# Patient Record
Sex: FEMALE | Race: WHITE | NOT HISPANIC OR LATINO | URBAN - METROPOLITAN AREA
[De-identification: names, ages, dates, MRNs, and addresses within clinical notes are randomized per-mention and may not be internally consistent; named-entity substitution may affect disease eponyms.]

---

## 2021-08-18 ENCOUNTER — INPATIENT (INPATIENT)
Facility: HOSPITAL | Age: 76
LOS: 1 days | Discharge: NURSING, REHAB OR LONG-TERM CARE | DRG: 299 | End: 2021-08-20
Attending: THORACIC SURGERY (CARDIOTHORACIC VASCULAR SURGERY) | Admitting: THORACIC SURGERY (CARDIOTHORACIC VASCULAR SURGERY)
Payer: MEDICARE

## 2021-08-18 VITALS
HEART RATE: 96 BPM | SYSTOLIC BLOOD PRESSURE: 131 MMHG | RESPIRATION RATE: 33 BRPM | WEIGHT: 116.4 LBS | DIASTOLIC BLOOD PRESSURE: 88 MMHG | OXYGEN SATURATION: 91 % | HEIGHT: 65 IN

## 2021-08-18 DIAGNOSIS — I25.10 ATHEROSCLEROTIC HEART DISEASE OF NATIVE CORONARY ARTERY WITHOUT ANGINA PECTORIS: ICD-10-CM

## 2021-08-18 DIAGNOSIS — I71.4 ABDOMINAL AORTIC ANEURYSM, WITHOUT RUPTURE: ICD-10-CM

## 2021-08-18 DIAGNOSIS — F20.9 SCHIZOPHRENIA, UNSPECIFIED: ICD-10-CM

## 2021-08-18 DIAGNOSIS — Z98.890 OTHER SPECIFIED POSTPROCEDURAL STATES: Chronic | ICD-10-CM

## 2021-08-18 DIAGNOSIS — Z90.710 ACQUIRED ABSENCE OF BOTH CERVIX AND UTERUS: Chronic | ICD-10-CM

## 2021-08-18 LAB
ALBUMIN SERPL ELPH-MCNC: 3.3 G/DL — SIGNIFICANT CHANGE UP (ref 3.3–5)
ALP SERPL-CCNC: 218 U/L — HIGH (ref 40–120)
ALT FLD-CCNC: 111 U/L — HIGH (ref 10–45)
ANION GAP SERPL CALC-SCNC: 12 MMOL/L — SIGNIFICANT CHANGE UP (ref 5–17)
APTT BLD: 28 SEC — SIGNIFICANT CHANGE UP (ref 27.5–35.5)
AST SERPL-CCNC: 69 U/L — HIGH (ref 10–40)
BASOPHILS # BLD AUTO: 0.02 K/UL — SIGNIFICANT CHANGE UP (ref 0–0.2)
BASOPHILS NFR BLD AUTO: 0.2 % — SIGNIFICANT CHANGE UP (ref 0–2)
BILIRUB SERPL-MCNC: 0.5 MG/DL — SIGNIFICANT CHANGE UP (ref 0.2–1.2)
BLD GP AB SCN SERPL QL: NEGATIVE — SIGNIFICANT CHANGE UP
BUN SERPL-MCNC: 12 MG/DL — SIGNIFICANT CHANGE UP (ref 7–23)
CALCIUM SERPL-MCNC: 9.4 MG/DL — SIGNIFICANT CHANGE UP (ref 8.4–10.5)
CHLORIDE SERPL-SCNC: 90 MMOL/L — LOW (ref 96–108)
CO2 SERPL-SCNC: 21 MMOL/L — LOW (ref 22–31)
CREAT SERPL-MCNC: 0.4 MG/DL — LOW (ref 0.5–1.3)
EOSINOPHIL # BLD AUTO: 0 K/UL — SIGNIFICANT CHANGE UP (ref 0–0.5)
EOSINOPHIL NFR BLD AUTO: 0 % — SIGNIFICANT CHANGE UP (ref 0–6)
GAS PNL BLDA: SIGNIFICANT CHANGE UP
GLUCOSE SERPL-MCNC: 158 MG/DL — HIGH (ref 70–99)
HCT VFR BLD CALC: 32.2 % — LOW (ref 34.5–45)
HGB BLD-MCNC: 10.3 G/DL — LOW (ref 11.5–15.5)
IMM GRANULOCYTES NFR BLD AUTO: 0.4 % — SIGNIFICANT CHANGE UP (ref 0–1.5)
INR BLD: 1.26 RATIO — HIGH (ref 0.88–1.16)
LYMPHOCYTES # BLD AUTO: 0.54 K/UL — LOW (ref 1–3.3)
LYMPHOCYTES # BLD AUTO: 4.6 % — LOW (ref 13–44)
MCHC RBC-ENTMCNC: 25.1 PG — LOW (ref 27–34)
MCHC RBC-ENTMCNC: 32 GM/DL — SIGNIFICANT CHANGE UP (ref 32–36)
MCV RBC AUTO: 78.3 FL — LOW (ref 80–100)
MONOCYTES # BLD AUTO: 0.83 K/UL — SIGNIFICANT CHANGE UP (ref 0–0.9)
MONOCYTES NFR BLD AUTO: 7 % — SIGNIFICANT CHANGE UP (ref 2–14)
NEUTROPHILS # BLD AUTO: 10.37 K/UL — HIGH (ref 1.8–7.4)
NEUTROPHILS NFR BLD AUTO: 87.8 % — HIGH (ref 43–77)
NRBC # BLD: 0 /100 WBCS — SIGNIFICANT CHANGE UP (ref 0–0)
NT-PROBNP SERPL-SCNC: 1807 PG/ML — HIGH (ref 0–300)
PLATELET # BLD AUTO: 440 K/UL — HIGH (ref 150–400)
POTASSIUM SERPL-MCNC: 4.3 MMOL/L — SIGNIFICANT CHANGE UP (ref 3.5–5.3)
POTASSIUM SERPL-SCNC: 4.3 MMOL/L — SIGNIFICANT CHANGE UP (ref 3.5–5.3)
PROT SERPL-MCNC: 6.1 G/DL — SIGNIFICANT CHANGE UP (ref 6–8.3)
PROTHROM AB SERPL-ACNC: 15 SEC — HIGH (ref 10.6–13.6)
RBC # BLD: 4.11 M/UL — SIGNIFICANT CHANGE UP (ref 3.8–5.2)
RBC # FLD: 14.5 % — SIGNIFICANT CHANGE UP (ref 10.3–14.5)
RH IG SCN BLD-IMP: POSITIVE — SIGNIFICANT CHANGE UP
RH IG SCN BLD-IMP: POSITIVE — SIGNIFICANT CHANGE UP
SODIUM SERPL-SCNC: 123 MMOL/L — LOW (ref 135–145)
WBC # BLD: 11.81 K/UL — HIGH (ref 3.8–10.5)
WBC # FLD AUTO: 11.81 K/UL — HIGH (ref 3.8–10.5)

## 2021-08-18 PROCEDURE — 99291 CRITICAL CARE FIRST HOUR: CPT

## 2021-08-18 PROCEDURE — 99292 CRITICAL CARE ADDL 30 MIN: CPT

## 2021-08-18 PROCEDURE — 36620 INSERTION CATHETER ARTERY: CPT

## 2021-08-18 RX ORDER — CEFUROXIME AXETIL 250 MG
1500 TABLET ORAL ONCE
Refills: 0 | Status: COMPLETED | OUTPATIENT
Start: 2021-08-18 | End: 2021-08-18

## 2021-08-18 RX ORDER — LABETALOL HCL 100 MG
1 TABLET ORAL
Qty: 400 | Refills: 0 | Status: DISCONTINUED | OUTPATIENT
Start: 2021-08-18 | End: 2021-08-19

## 2021-08-18 RX ORDER — CHLORHEXIDINE GLUCONATE 213 G/1000ML
15 SOLUTION TOPICAL ONCE
Refills: 0 | Status: COMPLETED | OUTPATIENT
Start: 2021-08-18 | End: 2021-08-18

## 2021-08-18 RX ORDER — CHLORHEXIDINE GLUCONATE 213 G/1000ML
1 SOLUTION TOPICAL ONCE
Refills: 0 | Status: COMPLETED | OUTPATIENT
Start: 2021-08-18 | End: 2021-08-18

## 2021-08-18 NOTE — ED BEHAVIORAL HEALTH ASSESSMENT NOTE - NSUNABLEASSESSPROTRISKCOMMENT_PSY_ALL_CORE
RN called and spoke with patient. Pleasant lady, well informed. She wants to know the CPT code for injection and also who does the PA for injection.  RN told her patient the CPT code from looking at the order but RN doesn't quite know who does PA for injection. RN told patient to try to  Contact imaging PA person and see if she has any contact or resources for this patient. Patient agreed to this plan and will keep RN posted.    Rickey Woodard RN      [facility] Call Center    Phone Message    May a detailed message be left on voicemail: yes     Reason for Call: Other: imaging and injection CPT codes needed for insurance. Pt would like for Carlos to call [facility] prior auth at 1 [phone], and provide the codes to them. Pt would like a confirmation phone call.      CPT codes for scan, MRI, and injection    Action Taken: Message routed to:  [facility] ([facility] ortho    Travel Screening: Not Applicable                                                                       N/A
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
## 2021-08-18 NOTE — H&P ADULT - NSHPLABSRESULTS_GEN_ALL_CORE
cefuroxime  IVPB 1500 milliGRAM(s) IV Intermittent once  chlorhexidine 0.12% Liquid 15 milliLiter(s) Swish and Spit once  chlorhexidine 4% Liquid 1 Application(s) Topical once  labetalol Infusion 1 mG/Min IV Continuous <Continuous>                            10.3   11.81 )-----------( 440      ( 18 Aug 2021 22:38 )             32.2       Hemoglobin: 10.3 g/dL (08-18 @ 22:38)            Creatinine Trend:     COAGS:           T(C): --  HR: 96 (08-18-21 @ 22:10) (96 - 96)  BP: 131/88 (08-18-21 @ 22:10) (131/88 - 131/88)  RR: 33 (08-18-21 @ 22:10) (33 - 33)  SpO2: 91% (08-18-21 @ 22:10) (91% - 91%)  Wt(kg): --    I&O's Summary      CT angio : + type A dissection .

## 2021-08-18 NOTE — H&P ADULT - NSICDXPASTMEDICALHX_GEN_ALL_CORE_FT
PAST MEDICAL HISTORY:  Acute on chronic systolic congestive heart failure     CAD (coronary artery disease)     Chronic atrial fibrillation     Elevated lipids     Schizophrenia

## 2021-08-18 NOTE — H&P ADULT - NSHPPHYSICALEXAM_GEN_ALL_CORE
alert and oriented x 3  no JVD  clear viraj , no wheeze   reg s1s2  soft , + bs , non tender  - edema, ++ pulses

## 2021-08-18 NOTE — ED BEHAVIORAL HEALTH ASSESSMENT NOTE - DESCRIPTION
She came to the ED at [facility] for dyspnea and had a chest x ray and cat scan demonstrating a aortic aneurysm and pericardial effusion.   She was transferred to [facility] for emergent repair of these medical conditions. unknown abdominal aortic aneurysm, schizophrenia
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
## 2021-08-18 NOTE — ED BEHAVIORAL HEALTH ASSESSMENT NOTE - OTHER
deferred frail and disheveled deferred (virtual visit) none elicited during brief interview unable to assess N/A none elicited limited as prematurely ended interview primary team unknown
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
## 2021-08-19 DIAGNOSIS — I71.00 DISSECTION OF UNSPECIFIED SITE OF AORTA: ICD-10-CM

## 2021-08-19 LAB
A1C WITH ESTIMATED AVERAGE GLUCOSE RESULT: 6 % — HIGH (ref 4–5.6)
CHOLEST SERPL-MCNC: 149 MG/DL — SIGNIFICANT CHANGE UP
ESTIMATED AVERAGE GLUCOSE: 126 MG/DL — HIGH (ref 68–114)
GAS PNL BLDA: SIGNIFICANT CHANGE UP
HDLC SERPL-MCNC: 45 MG/DL — LOW
LIPID PNL WITH DIRECT LDL SERPL: 95 MG/DL — SIGNIFICANT CHANGE UP
NON HDL CHOLESTEROL: 104 MG/DL — SIGNIFICANT CHANGE UP
OSMOLALITY SERPL: 263 MOSMOL/KG — LOW (ref 280–301)
OSMOLALITY UR: 158 MOS/KG — LOW (ref 300–900)
PREALB SERPL-MCNC: 9 MG/DL — LOW (ref 20–40)
SARS-COV-2 RNA SPEC QL NAA+PROBE: SIGNIFICANT CHANGE UP
T3 SERPL-MCNC: 61 NG/DL — LOW (ref 80–200)
T4 AB SER-ACNC: 7.8 UG/DL — SIGNIFICANT CHANGE UP (ref 4.6–12)
TRIGL SERPL-MCNC: 48 MG/DL — SIGNIFICANT CHANGE UP
TSH SERPL-MCNC: 1.36 UIU/ML — SIGNIFICANT CHANGE UP (ref 0.27–4.2)

## 2021-08-19 PROCEDURE — 99232 SBSQ HOSP IP/OBS MODERATE 35: CPT

## 2021-08-19 PROCEDURE — ZZZZZ: CPT

## 2021-08-19 PROCEDURE — 99291 CRITICAL CARE FIRST HOUR: CPT

## 2021-08-19 PROCEDURE — 94010 BREATHING CAPACITY TEST: CPT | Mod: 26

## 2021-08-19 RX ORDER — LABETALOL HCL 100 MG
1 TABLET ORAL
Qty: 400 | Refills: 0 | Status: DISCONTINUED | OUTPATIENT
Start: 2021-08-19 | End: 2021-08-19

## 2021-08-19 RX ORDER — SODIUM CHLORIDE 9 MG/ML
3 INJECTION INTRAMUSCULAR; INTRAVENOUS; SUBCUTANEOUS EVERY 8 HOURS
Refills: 0 | Status: DISCONTINUED | OUTPATIENT
Start: 2021-08-19 | End: 2021-08-20

## 2021-08-19 RX ORDER — ALBUTEROL 90 UG/1
2.5 AEROSOL, METERED ORAL THREE TIMES A DAY
Refills: 0 | Status: DISCONTINUED | OUTPATIENT
Start: 2021-08-19 | End: 2021-08-19

## 2021-08-19 RX ORDER — HALOPERIDOL DECANOATE 100 MG/ML
2 INJECTION INTRAMUSCULAR EVERY 12 HOURS
Refills: 0 | Status: DISCONTINUED | OUTPATIENT
Start: 2021-08-19 | End: 2021-08-20

## 2021-08-19 RX ORDER — ACETAMINOPHEN 500 MG
1000 TABLET ORAL ONCE
Refills: 0 | Status: COMPLETED | OUTPATIENT
Start: 2021-08-19 | End: 2021-08-19

## 2021-08-19 RX ORDER — HEPARIN SODIUM 5000 [USP'U]/ML
5000 INJECTION INTRAVENOUS; SUBCUTANEOUS EVERY 8 HOURS
Refills: 0 | Status: DISCONTINUED | OUTPATIENT
Start: 2021-08-20 | End: 2021-08-20

## 2021-08-19 RX ORDER — IPRATROPIUM/ALBUTEROL SULFATE 18-103MCG
3 AEROSOL WITH ADAPTER (GRAM) INHALATION EVERY 6 HOURS
Refills: 0 | Status: DISCONTINUED | OUTPATIENT
Start: 2021-08-19 | End: 2021-08-20

## 2021-08-19 RX ORDER — MAGNESIUM SULFATE 500 MG/ML
2 VIAL (ML) INJECTION ONCE
Refills: 0 | Status: COMPLETED | OUTPATIENT
Start: 2021-08-19 | End: 2021-08-19

## 2021-08-19 RX ORDER — PANTOPRAZOLE SODIUM 20 MG/1
40 TABLET, DELAYED RELEASE ORAL DAILY
Refills: 0 | Status: DISCONTINUED | OUTPATIENT
Start: 2021-08-19 | End: 2021-08-20

## 2021-08-19 RX ADMIN — Medication 50 GRAM(S): at 05:39

## 2021-08-19 RX ADMIN — Medication 3 MILLILITER(S): at 11:35

## 2021-08-19 RX ADMIN — HALOPERIDOL DECANOATE 2 MILLIGRAM(S): 100 INJECTION INTRAMUSCULAR at 17:14

## 2021-08-19 RX ADMIN — Medication 3 MILLILITER(S): at 05:07

## 2021-08-19 RX ADMIN — Medication 1000 MILLIGRAM(S): at 05:40

## 2021-08-19 RX ADMIN — Medication 400 MILLIGRAM(S): at 05:08

## 2021-08-19 RX ADMIN — PANTOPRAZOLE SODIUM 40 MILLIGRAM(S): 20 TABLET, DELAYED RELEASE ORAL at 12:04

## 2021-08-19 RX ADMIN — ALBUTEROL 2.5 MILLIGRAM(S): 90 AEROSOL, METERED ORAL at 01:44

## 2021-08-19 RX ADMIN — Medication 3 MILLILITER(S): at 17:10

## 2021-08-19 RX ADMIN — HALOPERIDOL DECANOATE 2 MILLIGRAM(S): 100 INJECTION INTRAMUSCULAR at 05:08

## 2021-08-19 RX ADMIN — SODIUM CHLORIDE 3 MILLILITER(S): 9 INJECTION INTRAMUSCULAR; INTRAVENOUS; SUBCUTANEOUS at 22:29

## 2021-08-19 NOTE — PROGRESS NOTE ADULT - ASSESSMENT
Ms. Fajardo has limited capacity for complex medical decisions i.e. informed consent for surgery. She may have limited capacity to weigh the risks versus benefits of such a complex surgery. However, she has capacity to determine her overall goals of care and advanced directives. That is, if she wants to focus on comfort versus aggressive intervention. Of note, capacity is decision specific, and it can wax and wane over time depending on factors such as mood, medical condition, nutritional status, and delirium. Practitioners should continuously optimize a patient’s capacity, to the extent possible return them to a baseline capacity, to help them make decisions for themselves.   In this case, each decision should be evaluated individually. Based on the patient's wishes a palliative care consult is reasonable.   And according to the Family Health Care Decisions Act, the cousin would be the surrogate decision maker is he agrees to assume that role.    Ethics commends the treatment teams for their virtue in the care of Ms. Fajardo. We will remain available for any further discussion points that may occur. Ms. Fajardo has limited capacity to weigh the risks versus benefits of such a complex surgery. However, Ms. Fajardo is consistent in her desire to forego surgery and demonstrates a desire to pursue comfort care with discharge home as her ultimate goal of care. In situations as complex as this, capacity is decision specific, and it can wax and wane over time depending on factors such as mood, medical condition, nutritional status, and delirium. Practitioners are duty-bound to continuously optimize a patient’s capacity, to the extent possible return them to a level of capacity that will allow them to make their own medical decisions and exercise their own Autonomy.    In this case, capacity for each decision should be evaluated individually. Based on the patient's wishes and current medical condition, comfort care is a reasonable alternative. Ethics recommends engaging the patient in an advance care planning discussion. In according to the Family Health Care Decisions Act, the patient's cousin Jaycob would be the surrogate decision maker should he agrees to assume that role.     Ethics commends the treatment teams for their virtue in the care of Ms. Fajardo. We will remain available for any further discussion points that may occur.    Ethics Consultation Service  Division of Medical Ethics  Department of Medicine  [phone]

## 2021-08-19 NOTE — PROGRESS NOTE ADULT - SUBJECTIVE AND OBJECTIVE BOX
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     REFERRALS:   [ ]Chaplaincy  [ ] Hospice  [ ]Child Life  [ ]Social Work  [ ]Case management [ ]Holistic Therapy     Goals of Care Document:

## 2021-08-19 NOTE — BH CONSULTATION LIAISON PROGRESS NOTE - NSBHFUPINTERVALHXFT_PSY_A_CORE
team requested psych f/u for capacity to sign DNR/DNI. Pt is disorganized, can not explain risks/benefits of DNR/DNI. pt gives ambivalent answers, states she is not sure what she would want to do . pt denies depression, mayte, anxiety, psychosis. no si/hi. no agitation.

## 2021-08-19 NOTE — CONSULT NOTE ADULT - CONSULT REASON
To assist in clinical decision making for a schizophrenic patient without insight with Type A Dissection who is verbalizing adamant refusal of surgical intervention and representation by familial surrogate

## 2021-08-19 NOTE — PROGRESS NOTE ADULT - PROBLEM SELECTOR PLAN 1
refusing intervention / surgery   [facility]  home dose haldol resumed   ethics consult  palliative/goals of care meeting  BP normal - normotensive   lives in assisted living - likely return upon discharge tte pending   refusing intervention / surgery   behavorial health consult - no capacity  home dose haldol resumed   ethics consult  palliative/goals of care meeting  BP normal - normotensive   lives in assisted living - likely return upon discharge
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
## 2021-08-19 NOTE — PROGRESS NOTE ADULT - PROBLEM SELECTOR PLAN 1
Discussed with PA on CTU, she discussed w/  Dr. Rogers no surgical intervention at this time   unclear if Cardiac effusion is needed to be evacuated at this time   - Hemodynamics stable   - off Labetalol    Will follow up with contacts given by patient

## 2021-08-19 NOTE — PROGRESS NOTE ADULT - SUBJECTIVE AND OBJECTIVE BOX
LABS:                10.3                 123  | 21   | 12           11.81 >-----------< 440     ------------------------< 158                   32.2                 4.3  | 90   | 0.40                                         Ca 9.4   Mg 1.6   Ph 3.1            VITAL SIGNS    Telemetry: afib 90s     Daily Height in cm: 165.1 (18 Aug 2021 22:10)    Daily Weight in k.8 (18 Aug 2021 22:10)      Vital Signs Last 24 Hrs  T(C): 36.1 (21 @ 16:00), Max: 36.5 (21 @ 04:00)  T(F): 97 (21 @ 16:00), Max: 97.7 (21 @ 04:00)  HR: 88 (21 @ 18:00) (71 - 100)  BP: 131/88 (21 @ 22:15) (131/88 - 131/88)  RR: 16 (21 @ 18:00) (15 - 50)  SpO2: 100% (21 @ 18:00) (87% - 100%)             I&O's Detail    18 Aug 2021 07:  -  19 Aug 2021 07:00  --------------------------------------------------------  IN:    Labetalol: 480 mL  Total IN: 480 mL    OUT:    Labetalol: 0 mL    Voided (mL): 50 mL  Total OUT: 50 mL    Total NET: 430 mL      19 Aug 2021 07:01  -  19 Aug 2021 20:53  --------------------------------------------------------  IN:  Total IN: 0 mL    OUT:    Labetalol: 0 mL    Voided (mL): 1580 mL  Total OUT: 1580 mL    Total NET: -1580 mL                    GLUCOSE  CAPILLARY BLOOD GLUCOSE                        PHYSICAL EXAM      General: NAD, well appearing, in no distress  Neurology: A&O x3, non focal, no neuro deficits. Moves all extremities to command.  CV : s1 s2 RRR, no murmurs, gallops, clicks  Lungs: clear to auscultation  Abdomen: soft, nontender, nondistended, positive bowel sounds  :    voiding        Extremities:    no  edema. + pedal pulses      Skin: intact, no lesions        MEDICATIONS  albuterol/ipratropium for Nebulization 3 milliLiter(s) Nebulizer every 6 hours  haloperidol    Concentrate 2 milliGRAM(s) Oral every 12 hours  pantoprazole  Injectable 40 milliGRAM(s) IV Push daily  sodium chloride 0.9% lock flush 3 milliLiter(s) IV Push every 8 hours             LABS:                10.3                 123  | 21   | 12           11.81 >-----------< 440     ------------------------< 158                   32.2                 4.3  | 90   | 0.40                                         Ca 9.4   Mg 1.6   Ph 3.1            VITAL SIGNS    Telemetry: afib 90s     Daily Height in cm: 165.1 (18 Aug 2021 22:10)    Daily Weight in k.8 (18 Aug 2021 22:10)      Vital Signs Last 24 Hrs  T(C): 36.1 (21 @ 16:00), Max: 36.5 (21 @ 04:00)  T(F): 97 (21 @ 16:00), Max: 97.7 (21 @ 04:00)  HR: 88 (21 @ 18:00) (71 - 100)  BP: 131/88 (21 @ 22:15) (131/88 - 131/88)  RR: 16 (21 @ 18:00) (15 - 50)  SpO2: 100% (21 @ 18:00) (87% - 100%)             I&O's Detail    18 Aug 2021 07:  -  19 Aug 2021 07:00  --------------------------------------------------------  IN:    Labetalol: 480 mL  Total IN: 480 mL    OUT:    Labetalol: 0 mL    Voided (mL): 50 mL  Total OUT: 50 mL    Total NET: 430 mL      19 Aug 2021 07:01  -  19 Aug 2021 20:53  --------------------------------------------------------  IN:  Total IN: 0 mL    OUT:    Labetalol: 0 mL    Voided (mL): 1580 mL  Total OUT: 1580 mL    Total NET: -1580 mL                    GLUCOSE  CAPILLARY BLOOD GLUCOSE                        PHYSICAL EXAM      General: NAD, well appearing, in no distress  Neurology: A&O x3, non focal, no neuro deficits. Moves all extremities to command.  CV : s1 s2 RRR, no murmurs, gallops, clicks  Lungs: clear to auscultation  Abdomen: soft, nontender, nondistended, positive bowel sounds  :    voiding        Extremities:    no  edema. + pedal pulses      Skin: intact, no lesions        MEDICATIONS  albuterol/ipratropium for Nebulization 3 milliLiter(s) Nebulizer every 6 hours  haloperidol    Concentrate 2 milliGRAM(s) Oral every 12 hours  pantoprazole  Injectable 40 milliGRAM(s) IV Push daily  sodium chloride 0.9% lock flush 3 milliLiter(s) IV Push every 8 hours

## 2021-08-19 NOTE — CONSULT NOTE ADULT - SUBJECTIVE AND OBJECTIVE BOX
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 beneficence/non-maleficence, and 3) social justice for a persons without capacity and history of mental illness.     Autonomy recognizes the patient’s right to choose the appropriate medical treatment based on her own beliefs, culture and needs. Social justice, sometimes referred to as distributive justice, refers to the equitable distribution and utilization of treatment and resources that maximizes benefit to society and the respect to vulnerable populations, while considering the vulnerability of patients with special needs (i.e. mental illness, developmental delay, disabilities, etc.). Essential to autonomy is determining if the patient has decisional capacity. Psychiatry has deemed the patient without capacity to refuse medical intervention. However, we can reaffirm this determination using Appelbaum’s criteria for assessing decisional capacity in patients1:     • Communications – can the patient communicate a choice?     • Understanding – does the patient understand the choice?     • Appreciation – can the patient appreciate the risks and benefits of choices and alternatives to the choice?     • Reasoning – can the patient provide a sound reason for his choice?     Ms. Fajardo is without capacity to make decisions regarding her surgical care. Though she can communicate, she does not meet the standard for capacity due to his lack of understanding about the severity of her medical condition, not appreciating medical facts, and lacks reasoning and insight for her refusal of treatments for conditions that are potentially life-threatening. Another issue pertaining to autonomy is the need for a surrogate decision-maker for this patient who lacks decision-making capacity.  In [facility], the Family Health Care Decisions Act of 2010 outlines a hierarchy of persons who may assume the role of surrogate decision-making if a Health Care Proxy form does not exists2:      • Mental Health Law 81 Guardian     • Spouse or Domestic Partner     • Adult Child     • Parent     • Adult Sibling     • Close Friend     At this juncture through the Family Health Care Decision Act, Ms Fajardo’s cousin has not expressed willingness to act as the patient’s surrogate decision-maker. At issue here is the patient’s consistent and vehement refusal of further surgical intervention. The question that arises is the ethics of proceeding with AAA repair in light of her refusal and against her expressed will (assent).     To answer, the ethical analysis must consider the ethical principles of beneficence and non-maleficence.  Generally, the bioethical principle of beneficence promotes the best possible health or quality of living or dying: with aggressive interventions when these help prolong life with “good quality,” with comfort care when cure and remission are not possible and the aim is to achieve the best “quality of life possible given the current circumstances”. This principle tends to go hand-in-hand with the other bioethical principle non-maleficence, or do no harm. This principle promotes the physician’s virtue to provide care that wouldn’t cause harm to the patient or provide care that would not be medically beneficial to the patient.  The act of balancing these two principles when it comes to medical treatments is called proportionality.  The moral obligation of the physician is to assess the benefits and risks of any proposed treatment prior to them being offered.  At this point, consideration of pursuing surgical intervention is in light of survival and candidacy for surgery. A Ernst Type A dissection if acute would be considered a surgical emergency. Benefits for surgery must outweigh the risks and alleviate imminent death or threat to life or limb to compel or coerce.  Any and all further treatment options are subject to the same considerations… benefits > risks.     To further address the ethical concerns regarding the patient’s refusal to the proposed interventions, the ethical analysis will consider the patient’s right to refuse and the beneficence/non-maleficence of compelling and coercing medical treatments. In cases where the patient retains decision-making capacity, the patient has the right to accept or refuse medical interventions once he has been informed of the risks/benefits of the procedure, informed of any potential alternatives to the procedure (including no action), and voluntarily makes his decision free of coercion4.  In cases involving patients who lack capacity to refuse potentially life-saving interventions informed consent can be obtained from a surrogate decision-maker or healthcare agent. However, regardless of capacity, compelling or coercing medical treatment absent patient assent is not ethically permissible unless the patient or illness poses an imminent danger to herself or others.  Obtaining the patient’s assent, with consent from the surrogate decision-maker would be the ideal route to respect the patient’s autonomy and provide beneficent/non-maleficent care.  However, based on the discussions with the patient, surrogate and based on the patient’s observed behavior, it is unlikely that the patient will assent to the medical treatment in a rapid fashion.  The challenge now is to assess if the AAA repair meets the standard for compelling or coercing treatment. If it is determined that the treatment in question meets this standard, and the care team cannot cagle patient assent, a treatment order over objection will be needed to proceed.         A further consideration of beneficence/non-maleficence (and by virtue patient autonomy) is the concept of personhood (moral status). Appreciating Ms. Fajardo’s life story and personal beliefs, the surgical/critical care team has instituted medical management to ascertain the chronicity of her aortic aneurysm. Although the patient is hemodynamically stable at present, there is an increased likelihood of decompensation such that advanced directives should be addressed. A palliative consult would be reasonable in light of surgical refusal.

## 2021-08-19 NOTE — BH CONSULTATION LIAISON PROGRESS NOTE - CURRENT MEDICATION
MEDICATIONS  (STANDING):  albuterol/ipratropium for Nebulization 3 milliLiter(s) Nebulizer every 6 hours  haloperidol    Concentrate 2 milliGRAM(s) Oral every 12 hours  labetalol Infusion 1 mG/Min (60 mL/Hr) IV Continuous <Continuous>  pantoprazole  Injectable 40 milliGRAM(s) IV Push daily    MEDICATIONS  (PRN):

## 2021-08-19 NOTE — BH CONSULTATION LIAISON PROGRESS NOTE - NSBHCHARTREVIEWVS_PSY_A_CORE FT
Vital Signs Last 24 Hrs  T(C): 35.7 (19 Aug 2021 12:00), Max: 36.5 (19 Aug 2021 04:00)  T(F): 96.3 (19 Aug 2021 12:00), Max: 97.7 (19 Aug 2021 04:00)  HR: 85 (19 Aug 2021 15:00) (71 - 100)  BP: 131/88 (18 Aug 2021 22:15) (131/88 - 131/88)  BP(mean): 105 (18 Aug 2021 22:15) (105 - 105)  RR: 22 (19 Aug 2021 15:00) (15 - 50)  SpO2: 93% (19 Aug 2021 15:00) (87% - 100%)

## 2021-08-19 NOTE — BH CONSULTATION LIAISON PROGRESS NOTE - NSBHCHARTREVIEWLAB_PSY_A_CORE FT
10.3   11.81 )-----------( 440      ( 18 Aug 2021 22:38 )             32.2     08-18    123<L>  |  90<L>  |  12  ----------------------------<  158<H>  4.3   |  21<L>  |  0.40<L>    Ca    9.4      18 Aug 2021 22:38  Phos  3.1     08-18  Mg     1.6     08-18    TPro  6.1  /  Alb  3.3  /  TBili  0.5  /  DBili  x   /  AST  69<H>  /  ALT  111<H>  /  AlkPhos  218<H>  08-18

## 2021-08-19 NOTE — PROGRESS NOTE ADULT - CONVERSATION DETAILS
DISCUSSED WITH Patient and Ethics regarding current clinical situation, patient appears to understand that she has an aliment with her heart that may need surgery but current is unable to explain the complexity.     Patient states she would like for us to notify John Lo and Radha Rodriguez (his wife) regarding her current hospitalization.     Able to Reach Radha rodriguez [phone], discussed with her current situation and need for her or her son Jaycob [phone]  to be involved in current  and future  decision making.     Discussed with SW on CTU unable to reach Noemy Soto for any further information

## 2021-08-19 NOTE — PROGRESS NOTE ADULT - PROBLEM SELECTOR PLAN 2
- Recommend reevaluation by Psychiatry   - need help to determine Capacity to make medical decisions
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
## 2021-08-19 NOTE — CONSULT NOTE ADULT - ASSESSMENT
Conclusion     Ethics commends the treatment teams for their virtue in the care of Ms. Fajardo.  This case presents many challenging ethical and legal issues that require an interdisciplinary approach to address.  Should the patient become hemodynamically unstable that surgery meets the standard for “urgent and emergent risk to life or limb” treatment may be compelled. However, at present compelling without patient assent is ethically complex given this patient’s past history. Chemical or physical restraint could induce fatal deterioration.  At this juncture, psychiatrically and medically optimizing the patient may be in her best interest. Suggest palliative consultation as given co-morbidities and patient’s current co-morbid conditions, morbidity and mortality risks for death are increased and complex regardless of surgical or medical approach. Physicians are not obligated to perform procedures where there is no expectation of benefit. This [facility] meets [facility] for withholding/withdrawal life sustaining treatment due to extraordinary burden that may be induced in light of patient’s past history with failing to wean from mechanical ventilation.     MORE THAN 50% OF THE TIME OF THIS CONSULTATION WAS SPENT IN COORDINATION OF CARE OF PATIENT

## 2021-08-19 NOTE — PROGRESS NOTE ADULT - SUBJECTIVE AND OBJECTIVE BOX
CRITICAL CARE ATTENDING - CTICU    MEDICATIONS  (STANDING):  albuterol/ipratropium for Nebulization 3 milliLiter(s) Nebulizer every 6 hours  haloperidol    Concentrate 2 milliGRAM(s) Oral every 12 hours  labetalol Infusion 1 mG/Min (60 mL/Hr) IV Continuous <Continuous>  pantoprazole  Injectable 40 milliGRAM(s) IV Push daily                                    10.3   11.81 )-----------( 440      ( 18 Aug 2021 22:38 )             32.2           123<L>  |  90<L>  |  12  ----------------------------<  158<H>  4.3   |  21<L>  |  0.40<L>    Ca    9.4      18 Aug 2021 22:38  Phos  3.1       Mg     1.6         TPro  6.1  /  Alb  3.3  /  TBili  0.5  /  DBili  x   /  AST  69<H>  /  ALT  111<H>  /  AlkPhos  218<H>        PT/INR - ( 18 Aug 2021 22:38 )   PT: 15.0 sec;   INR: 1.26 ratio         PTT - ( 18 Aug 2021 22:38 )  PTT:28.0 sec        Daily Height in cm: 165.1 (18 Aug 2021 22:10)    Daily Weight in k.8 (18 Aug 2021 22:10)       @ 07:01  -  08-19 @ 06:58  --------------------------------------------------------  IN: 480 mL / OUT: 50 mL / NET: 430 mL        Critically Ill patient  : [ ] preoperative ,   [ ] post operative [x] Non-operative management     Requires :  [x] Arterial Line   [ ] Central Line  [ ] PA catheter  [ ] IABP  [ ] ECMO  [ ] LVAD  [ ] Ventilator  [ ] pacemaker [ ] Impella.                      [x] ABG's     [x] Pulse Oxymetry Monitoring  Bedside evaluation , monitoring , treatment of hemodynamics , fluids , IVP/ IVCD meds.        Diagnosis:     Type A dissection/ Intramural hematoma     Pericardial Effusion     Hypertension - IVCD Labetalol     CAD    CHF- acute [x]   chronic [x]    systolic [x]   diatolic [ ]          - Echo- EF -             [ ] RV dysfunction          - Cxr-cardiomegally, edema          - Clinical-  [ ]inotropes   [ ]pressors   [ ]diuresis   [ ]IABP   [ ]ECMO   [ ]LVAD   [ ]Respiratory Failure.     A-Fib     Hypoxemia - Requires [ ] BIPAP  [x] HF O2     GI bleed - Protonix     Schizophrenia     hyponatremia     H/O tracheostomy     Requires bedside physical therapy, mobilization and total alf care.     Requires chest PT, pulmonary toilet, ambu bagging, suctioning to maintain SaO2,  patent airway and treat atelectasis.     ECG       I, Albert Nagel, personally performed the services described in this documentation. All medical record entries made by the scribe were at my direction and in my presence. I have reviewed the chart and agree that the record reflects my personal performance and is accurate and complete.   Albert Nagel MD.       By signing my name below, I, Patti Joe, attest that this documentation has been prepared under the direction and in the presence of Albert Nagel MD.   Electronically Signed: Patti Joe Scribe 21 @ 06:58        Discussed with CT surgeon, Physician Assistant - Nurse Practitioner- Critical care medicine team.   Dicussed at  AM / PM rounds.   Chart, labs , films reviewed.    Cumulative Critical Care Time Given Today:  CRITICAL CARE ATTENDING - CTICU    MEDICATIONS  (STANDING):  albuterol/ipratropium for Nebulization 3 milliLiter(s) Nebulizer every 6 hours  haloperidol    Concentrate 2 milliGRAM(s) Oral every 12 hours  labetalol Infusion 1 mG/Min (60 mL/Hr) IV Continuous <Continuous>  pantoprazole  Injectable 40 milliGRAM(s) IV Push daily                                    10.3   11.81 )-----------( 440      ( 18 Aug 2021 22:38 )             32.2           123<L>  |  90<L>  |  12  ----------------------------<  158<H>  4.3   |  21<L>  |  0.40<L>    Ca    9.4      18 Aug 2021 22:38  Phos  3.1       Mg     1.6         TPro  6.1  /  Alb  3.3  /  TBili  0.5  /  DBili  x   /  AST  69<H>  /  ALT  111<H>  /  AlkPhos  218<H>        PT/INR - ( 18 Aug 2021 22:38 )   PT: 15.0 sec;   INR: 1.26 ratio         PTT - ( 18 Aug 2021 22:38 )  PTT:28.0 sec        Daily Height in cm: 165.1 (18 Aug 2021 22:10)    Daily Weight in k.8 (18 Aug 2021 22:10)       @ 07:01  -  08-19 @ 06:58  --------------------------------------------------------  IN: 480 mL / OUT: 50 mL / NET: 430 mL        Critically Ill patient  : [ ] preoperative ,   [ ] post operative  [x] Non-operative management     Requires :  [x] Arterial Line   [ ] Central Line  [ ] PA catheter  [ ] IABP  [ ] ECMO  [ ] LVAD  [ ] Ventilator  [ ] pacemaker [ ] Impella.                      [x] ABG's     [x] Pulse Oxymetry Monitoring  Bedside evaluation , monitoring , treatment of hemodynamics , fluids , IVP/ IVCD meds.        Diagnosis:     Type A dissection/ Intramural hematoma     Pericardial Effusion     Hypertension - IVCD Labetalol     Hemodynamic lability,  instability. Requires IVCD [x ]  Labetalol   to maintain MAP, perfusion, C.I. /   control  MAP    CAD    CHF- acute [x]   chronic [x]    systolic []   diatolic [ x]          - Echo- EF -             [ ] RV dysfunction          - Cxr-cardiomegally, edema          - Clinical-  [ ]inotropes   [ ]pressors   [ ]diuresis   [ ]IABP   [ ]ECMO   [ ]LVAD   [ ]Respiratory Failure.     A-Fib     Hypoxemia - Requires [ ] BIPAP  [x] HF O2     GI bleed - Protonix     Schizophrenia     hyponatremia - r/o SIADH     H/O tracheostomy in past     Requires bedside physical therapy, mobilization and total senior living care.      ECG       IAlbert, personally performed the services described in this documentation. All medical record entries made by the scribe were at my direction and in my presence. I have reviewed the chart and agree that the record reflects my personal performance and is accurate and complete.   Albert Nagel MD.       By signing my name below, I, Patti Joe, attest that this documentation has been prepared under the direction and in the presence of Albert Nagel MD.   Electronically Signed: Patti Joe Scribe -19- @ 06:58        Discussed with CT surgeon, Physician Assistant - Nurse Practitioner- Critical care medicine team.   Dicussed at  AM / PM rounds.   Chart, labs , films reviewed.    Cumulative Critical Care Time Given Today:  30 min

## 2021-08-20 VITALS — HEART RATE: 109 BPM | DIASTOLIC BLOOD PRESSURE: 95 MMHG | SYSTOLIC BLOOD PRESSURE: 131 MMHG | TEMPERATURE: 98 F

## 2021-08-20 DIAGNOSIS — Z51.5 ENCOUNTER FOR PALLIATIVE CARE: ICD-10-CM

## 2021-08-20 DIAGNOSIS — Z71.89 OTHER SPECIFIED COUNSELING: ICD-10-CM

## 2021-08-20 LAB — SARS-COV-2 RNA SPEC QL NAA+PROBE: SIGNIFICANT CHANGE UP

## 2021-08-20 PROCEDURE — 85014 HEMATOCRIT: CPT

## 2021-08-20 PROCEDURE — 84132 ASSAY OF SERUM POTASSIUM: CPT

## 2021-08-20 PROCEDURE — 85610 PROTHROMBIN TIME: CPT

## 2021-08-20 PROCEDURE — 80061 LIPID PANEL: CPT

## 2021-08-20 PROCEDURE — 83036 HEMOGLOBIN GLYCOSYLATED A1C: CPT

## 2021-08-20 PROCEDURE — 84480 ASSAY TRIIODOTHYRONINE (T3): CPT

## 2021-08-20 PROCEDURE — 83520 IMMUNOASSAY QUANT NOS NONAB: CPT

## 2021-08-20 PROCEDURE — 84443 ASSAY THYROID STIM HORMONE: CPT

## 2021-08-20 PROCEDURE — U0003: CPT

## 2021-08-20 PROCEDURE — 86901 BLOOD TYPING SEROLOGIC RH(D): CPT

## 2021-08-20 PROCEDURE — 94010 BREATHING CAPACITY TEST: CPT

## 2021-08-20 PROCEDURE — 99238 HOSP IP/OBS DSCHRG MGMT 30/<: CPT

## 2021-08-20 PROCEDURE — 83605 ASSAY OF LACTIC ACID: CPT

## 2021-08-20 PROCEDURE — 82947 ASSAY GLUCOSE BLOOD QUANT: CPT

## 2021-08-20 PROCEDURE — 84134 ASSAY OF PREALBUMIN: CPT

## 2021-08-20 PROCEDURE — 84436 ASSAY OF TOTAL THYROXINE: CPT

## 2021-08-20 PROCEDURE — 83880 ASSAY OF NATRIURETIC PEPTIDE: CPT

## 2021-08-20 PROCEDURE — 80053 COMPREHEN METABOLIC PANEL: CPT

## 2021-08-20 PROCEDURE — 94640 AIRWAY INHALATION TREATMENT: CPT

## 2021-08-20 PROCEDURE — 85025 COMPLETE CBC W/AUTO DIFF WBC: CPT

## 2021-08-20 PROCEDURE — 84100 ASSAY OF PHOSPHORUS: CPT

## 2021-08-20 PROCEDURE — 82435 ASSAY OF BLOOD CHLORIDE: CPT

## 2021-08-20 PROCEDURE — 85018 HEMOGLOBIN: CPT

## 2021-08-20 PROCEDURE — 83930 ASSAY OF BLOOD OSMOLALITY: CPT

## 2021-08-20 PROCEDURE — U0005: CPT

## 2021-08-20 PROCEDURE — 86850 RBC ANTIBODY SCREEN: CPT

## 2021-08-20 PROCEDURE — 82803 BLOOD GASES ANY COMBINATION: CPT

## 2021-08-20 PROCEDURE — 83935 ASSAY OF URINE OSMOLALITY: CPT

## 2021-08-20 PROCEDURE — 82565 ASSAY OF CREATININE: CPT

## 2021-08-20 PROCEDURE — 82330 ASSAY OF CALCIUM: CPT

## 2021-08-20 PROCEDURE — 85730 THROMBOPLASTIN TIME PARTIAL: CPT

## 2021-08-20 PROCEDURE — 83735 ASSAY OF MAGNESIUM: CPT

## 2021-08-20 PROCEDURE — 86900 BLOOD TYPING SEROLOGIC ABO: CPT

## 2021-08-20 PROCEDURE — 84295 ASSAY OF SERUM SODIUM: CPT

## 2021-08-20 RX ORDER — PANTOPRAZOLE SODIUM 20 MG/1
1 TABLET, DELAYED RELEASE ORAL
Qty: 0 | Refills: 0 | DISCHARGE

## 2021-08-20 RX ORDER — METOPROLOL TARTRATE 50 MG
25 TABLET ORAL
Refills: 0 | Status: DISCONTINUED | OUTPATIENT
Start: 2021-08-20 | End: 2021-08-20

## 2021-08-20 RX ORDER — IPRATROPIUM/ALBUTEROL SULFATE 18-103MCG
3 AEROSOL WITH ADAPTER (GRAM) INHALATION
Qty: 0 | Refills: 0 | DISCHARGE
Start: 2021-08-20

## 2021-08-20 RX ORDER — HALOPERIDOL DECANOATE 100 MG/ML
1 INJECTION INTRAMUSCULAR
Qty: 0 | Refills: 0 | DISCHARGE
Start: 2021-08-20

## 2021-08-20 RX ORDER — METOPROLOL TARTRATE 50 MG
1 TABLET ORAL
Qty: 0 | Refills: 0 | DISCHARGE
Start: 2021-08-20

## 2021-08-20 RX ADMIN — Medication 25 MILLIGRAM(S): at 07:48

## 2021-08-20 RX ADMIN — SODIUM CHLORIDE 3 MILLILITER(S): 9 INJECTION INTRAMUSCULAR; INTRAVENOUS; SUBCUTANEOUS at 07:02

## 2021-08-20 RX ADMIN — HALOPERIDOL DECANOATE 2 MILLIGRAM(S): 100 INJECTION INTRAMUSCULAR at 06:56

## 2021-08-20 RX ADMIN — Medication 3 MILLILITER(S): at 17:30

## 2021-08-20 RX ADMIN — Medication 25 MILLIGRAM(S): at 17:37

## 2021-08-20 NOTE — DISCHARGE NOTE PROVIDER - NSDCMRMEDTOKEN_GEN_ALL_CORE_FT
haloperidol 2 mg/mL oral concentrate: 1 milliliter(s) orally every 12 hours  ipratropium-albuterol 0.5 mg-2.5 mg/3 mL inhalation solution: 3 milliliter(s) inhaled every 6 hours  metoprolol tartrate 25 mg oral tablet: 1 tab(s) orally 2 times a day  Protonix 40 mg oral delayed release tablet: 1 tab(s) orally once a day
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
## 2021-08-20 NOTE — PROGRESS NOTE ADULT - ASSESSMENT
Patient deemed without capacity for medical decisions related to surgery and advance care planning. Ethicist spoke with two possible surrogate decision-makers, Jaycob Wally and Jenna Hayes, patient's cousins. Mr. Hayes will confer with his sister and call back with further questions regarding their role as surrogate decision-maker.    Progress note will be updated accordingly.    In the meantime, please call with questions or concerns.    Gian Del Real DrPH, [facility]-  Medical Ethicist  Division of Medical Ethics  [phone]

## 2021-08-20 NOTE — PROGRESS NOTE ADULT - REASON FOR ADMISSION
Type A dissection

## 2021-08-20 NOTE — DISCHARGE NOTE PROVIDER - NSDCPNSUBOBJ_GEN_ALL_CORE
VITAL SIGNS    Subjective: Denies CP, palpitation, SOB, DUEÑAS, HA, dizziness, N/V/D, fever or chills.  No acute event noted overnight.     Telemetry: Afib      Vital Signs Last 24 Hrs  T(C): 36.4 (08-20-21 @ 05:00), Max: 36.4 (08-19-21 @ 19:50)  T(F): 97.5 (08-20-21 @ 05:00), Max: 97.6 (08-19-21 @ 19:50)  HR: 82 (08-20-21 @ 05:00) (76 - 98)  BP: 124/91 (08-20-21 @ 05:00) (124/91 - 126/83)  RR: 18 (08-20-21 @ 05:00) (16 - 35)  SpO2: 98% (08-20-21 @ 05:00) (91% - 100%)           08-19 @ 07:01  -  08-20 @ 07:00  --------------------------------------------------------  IN: 300 mL / OUT: 1580 mL / NET: -1280 mL    08-20 @ 07:01  -  08-20 @ 11:38  --------------------------------------------------------  IN: 240 mL / OUT: 0 mL / NET: 240 mL    PHYSICAL EXAM    Neurology: alert and oriented x 3, nonfocal, no gross deficits    CV: (+) Irregular S1 and S2, No murmurs, rubs, gallops or clicks     Lungs: CTA B/L     Abdomen: soft, nontender, nondistended, positive bowel sounds, (+) Flatus; (+) BM     :  Voiding               Extremities:  B/L LE (-) edema; negative calf tenderness; (+) 2 DP palpable        albuterol/ipratropium for Nebulization 3 milliLiter(s) Nebulizer every 6 hours  haloperidol    Concentrate 2 milliGRAM(s) Oral every 12 hours  heparin   Injectable 5000 Unit(s) SubCutaneous every 8 hours  metoprolol tartrate 25 milliGRAM(s) Oral two times a day  pantoprazole  Injectable 40 milliGRAM(s) IV Push daily  sodium chloride 0.9% lock flush 3 milliLiter(s) IV Push every 8 hours    Spent 35 min face to face encounter with patient and discharge note.

## 2021-08-20 NOTE — DISCHARGE NOTE NURSING/CASE MANAGEMENT/SOCIAL WORK - PATIENT PORTAL LINK FT
You can access the FollowMyHealth Patient Portal offered by [facility] by registering at the following website: [email or web address]. By joining [facility] FollowMyHealth portal, you will also be able to view your health information using other applications (apps) compatible with our system.

## 2021-08-20 NOTE — DISCHARGE NOTE PROVIDER - NSDCCPCAREPLAN_GEN_ALL_CORE_FT
PRINCIPAL DISCHARGE DIAGNOSIS  Diagnosis: Intramural hematoma  Assessment and Plan of Treatment: Intramural Aortic Hematoma   1. Continue with current medication regimen as instructed in your discharge paperwork.  2. Daily Shower   3. Regular DASH diet - low fat, low cholesterol, no added salt.
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
## 2021-08-20 NOTE — DISCHARGE NOTE PROVIDER - NSDCFUADDAPPT_GEN_ALL_CORE_FT
1. Please schedule an appointment with your Cardiologist in 1-2 weeks, please call the office to schedule an appointment.   2. Please schedule an appointment with your PCP in 1 week, call the office to schedule an appointment per routine.

## 2021-08-21 LAB — TSH RECEP AB FLD-ACNC: <1.1 IU/L — SIGNIFICANT CHANGE UP (ref 0–1.75)

## 2022-06-16 NOTE — DISCHARGE NOTE PROVIDER - NPI NUMBER (FOR SYSADMIN USE ONLY) :
Price (Do Not Change): 0.00
Instructions: This plan will send the code FBSE to the PM system.  DO NOT or CHANGE the price.
Detail Level: Simple
[[phone]]

## 2023-04-20 NOTE — ED BEHAVIORAL HEALTH ASSESSMENT NOTE - THOUGHT PROCESS
Illogical/Impaired reasoning Syringe Size Used (Required For Enhanced Ndc): 300 mg/2ml prefilled pen